# Patient Record
Sex: FEMALE | Race: BLACK OR AFRICAN AMERICAN | NOT HISPANIC OR LATINO | Employment: OTHER | URBAN - METROPOLITAN AREA
[De-identification: names, ages, dates, MRNs, and addresses within clinical notes are randomized per-mention and may not be internally consistent; named-entity substitution may affect disease eponyms.]

---

## 2017-06-02 LAB — CRC RECOMMENDATION EXT: NORMAL

## 2020-01-31 PROBLEM — E66.01 MORBID OBESITY, UNSPECIFIED OBESITY TYPE: Status: ACTIVE | Noted: 2017-05-10

## 2020-01-31 PROBLEM — F14.11 H/O COCAINE ABUSE: Status: ACTIVE | Noted: 2017-05-10

## 2020-01-31 PROBLEM — D17.1 LIPOMA OF BACK: Status: ACTIVE | Noted: 2017-08-11

## 2020-01-31 PROBLEM — F31.9 BIPOLAR 1 DISORDER: Status: ACTIVE | Noted: 2017-05-02

## 2020-01-31 PROBLEM — F14.10 COCAINE ABUSE: Status: ACTIVE | Noted: 2020-01-31

## 2020-01-31 PROBLEM — I10 HTN (HYPERTENSION), BENIGN: Status: ACTIVE | Noted: 2017-05-02

## 2020-01-31 PROBLEM — I42.9 CARDIOMYOPATHY: Status: ACTIVE | Noted: 2020-01-31

## 2020-01-31 PROBLEM — F33.3 MDD (MAJOR DEPRESSIVE DISORDER), RECURRENT, SEVERE, WITH PSYCHOSIS: Status: ACTIVE | Noted: 2017-05-10

## 2020-02-02 PROBLEM — Z86.19 HISTORY OF SYPHILIS: Status: ACTIVE | Noted: 2020-02-02

## 2020-05-21 PROBLEM — E66.9 OBESITY: Status: ACTIVE | Noted: 2017-05-10

## 2020-05-23 PROBLEM — E83.39 HYPOPHOSPHATEMIA: Status: ACTIVE | Noted: 2020-05-23

## 2020-06-03 ENCOUNTER — NURSE TRIAGE (OUTPATIENT)
Dept: ADMINISTRATIVE | Facility: CLINIC | Age: 55
End: 2020-06-03

## 2020-06-03 NOTE — TELEPHONE ENCOUNTER
Attempted to contact the pt x1 at [phone] regarding day 13 of [facility] Post Procedural Symptom Tracker.  Unable to reach patient, unable to leave message.    Reason for Disposition   No answer.  First attempt to contact caller.  Follow-up call scheduled within 15 minutes.    Protocols used: NO CONTACT OR DUPLICATE CONTACT CALL-A-AH

## 2020-06-04 ENCOUNTER — NURSE TRIAGE (OUTPATIENT)
Dept: ADMINISTRATIVE | Facility: CLINIC | Age: 55
End: 2020-06-04

## 2020-06-04 NOTE — TELEPHONE ENCOUNTER
Called for post procedural symptom tracker. No answer after 2nd attempt.    Reason for Disposition   Second attempt to contact family AND no contact made. Phone number verified.    Additional Information   Negative: Caller has already spoken with the PCP (or office), and has no further questions   Negative: Caller has already spoken with another triager and has no further questions   Negative: Caller has already spoken with another triager or PCP (or office), and has further questions and triager able to answer questions.   Negative: Busy signal.  First attempt to contact caller.  Follow-up call scheduled within 15 minutes.   Negative: No answer.  First attempt to contact caller.  Follow-up call scheduled within 15 minutes.   Negative: Message left on unidentified voice mail. Phone number verified.   Negative: Message left on identified voicemail   Negative: Message left with person in household   Negative: Wrong number reached. Phone number verified.    Protocols used: NO CONTACT OR DUPLICATE CONTACT CALL-A-OH

## 2020-06-04 NOTE — TELEPHONE ENCOUNTER
Reason for Disposition   Caller has cancelled the call before the first contact    Additional Information   Negative: Caller has already spoken with the PCP (or office), and has no further questions   Negative: Caller has already spoken with another triager and has no further questions   Negative: Caller has already spoken with another triager or PCP (or office), and has further questions and triager able to answer questions.   Negative: Busy signal.  First attempt to contact caller.  Follow-up call scheduled within 15 minutes.   Negative: No answer.  First attempt to contact caller.  Follow-up call scheduled within 15 minutes.   Negative: Message left on identified voicemail   Negative: Message left on unidentified voice mail. Phone number verified.   Negative: Message left with person in household   Negative: Wrong number reached. Phone number verified.   Negative: Second attempt to contact family AND no contact made. Phone number verified.   Negative: Cell phone out of range. Phone number verified.   Negative: Patient already left for the hospital/clinic    Protocols used: NO CONTACT OR DUPLICATE CONTACT CALL-A-OH

## 2020-06-10 PROBLEM — Z98.890 STATUS POST COLOSTOMY TAKEDOWN: Status: ACTIVE | Noted: 2020-06-10

## 2020-06-10 PROBLEM — Z90.49 STATUS POST SMALL BOWEL RESECTION: Status: ACTIVE | Noted: 2020-06-10

## 2020-06-10 PROBLEM — Z90.49 STATUS POST APPENDECTOMY: Status: ACTIVE | Noted: 2020-06-10

## 2020-06-17 PROBLEM — Z48.89 ENCOUNTER FOR POSTOPERATIVE WOUND CHECK: Status: ACTIVE | Noted: 2020-06-17

## 2020-07-13 PROBLEM — E87.6 HYPOKALEMIA: Status: ACTIVE | Noted: 2020-07-13

## 2020-07-13 PROBLEM — F17.210 CIGARETTE SMOKER: Status: ACTIVE | Noted: 2020-07-13

## 2020-07-13 PROBLEM — R10.9 RIGHT FLANK PAIN: Status: ACTIVE | Noted: 2020-07-13

## 2020-07-13 PROBLEM — I26.99 ACUTE PULMONARY EMBOLISM WITHOUT ACUTE COR PULMONALE: Status: ACTIVE | Noted: 2020-07-13

## 2020-11-03 PROBLEM — J06.9 VIRAL UPPER RESPIRATORY TRACT INFECTION: Status: ACTIVE | Noted: 2020-11-03

## 2022-04-13 PROBLEM — G62.9 NEUROPATHY: Status: ACTIVE | Noted: 2022-04-13

## 2022-05-27 ENCOUNTER — PATIENT OUTREACH (OUTPATIENT)
Dept: ADMINISTRATIVE | Facility: HOSPITAL | Age: 57
End: 2022-05-27
Payer: MEDICAID

## 2022-05-27 ENCOUNTER — PATIENT MESSAGE (OUTPATIENT)
Dept: ADMINISTRATIVE | Facility: HOSPITAL | Age: 57
End: 2022-05-27
Payer: MEDICAID

## 2022-07-14 ENCOUNTER — OFFICE VISIT (OUTPATIENT)
Dept: PRIMARY CARE CLINIC | Facility: CLINIC | Age: 57
End: 2022-07-14
Payer: MEDICAID

## 2022-07-14 VITALS
OXYGEN SATURATION: 99 % | HEIGHT: 64 IN | HEART RATE: 67 BPM | SYSTOLIC BLOOD PRESSURE: 142 MMHG | WEIGHT: 155.81 LBS | BODY MASS INDEX: 26.6 KG/M2 | DIASTOLIC BLOOD PRESSURE: 90 MMHG

## 2022-07-14 DIAGNOSIS — Z48.89 ENCOUNTER FOR POSTOPERATIVE WOUND CHECK: ICD-10-CM

## 2022-07-14 DIAGNOSIS — G62.9 NEUROPATHY: ICD-10-CM

## 2022-07-14 DIAGNOSIS — I50.9 CHRONIC CONGESTIVE HEART FAILURE, UNSPECIFIED HEART FAILURE TYPE: ICD-10-CM

## 2022-07-14 DIAGNOSIS — M79.604 PAIN OF RIGHT LOWER EXTREMITY: ICD-10-CM

## 2022-07-14 DIAGNOSIS — J30.89 ENVIRONMENTAL AND SEASONAL ALLERGIES: ICD-10-CM

## 2022-07-14 DIAGNOSIS — Z12.39 ENCOUNTER FOR SCREENING FOR MALIGNANT NEOPLASM OF BREAST, UNSPECIFIED SCREENING MODALITY: ICD-10-CM

## 2022-07-14 DIAGNOSIS — F31.9 BIPOLAR 1 DISORDER: ICD-10-CM

## 2022-07-14 DIAGNOSIS — I10 HTN (HYPERTENSION), BENIGN: ICD-10-CM

## 2022-07-14 DIAGNOSIS — R21 RASH AND NONSPECIFIC SKIN ERUPTION: Primary | ICD-10-CM

## 2022-07-14 PROCEDURE — 1159F MED LIST DOCD IN RCRD: CPT | Mod: CPTII,S$GLB,, | Performed by: INTERNAL MEDICINE

## 2022-07-14 PROCEDURE — 3077F PR MOST RECENT SYSTOLIC BLOOD PRESSURE >= 140 MM HG: ICD-10-PCS | Mod: CPTII,S$GLB,, | Performed by: INTERNAL MEDICINE

## 2022-07-14 PROCEDURE — 1159F PR MEDICATION LIST DOCUMENTED IN MEDICAL RECORD: ICD-10-PCS | Mod: CPTII,S$GLB,, | Performed by: INTERNAL MEDICINE

## 2022-07-14 PROCEDURE — 3077F SYST BP >= 140 MM HG: CPT | Mod: CPTII,S$GLB,, | Performed by: INTERNAL MEDICINE

## 2022-07-14 PROCEDURE — 3008F PR BODY MASS INDEX (BMI) DOCUMENTED: ICD-10-PCS | Mod: CPTII,S$GLB,, | Performed by: INTERNAL MEDICINE

## 2022-07-14 PROCEDURE — 3080F DIAST BP >= 90 MM HG: CPT | Mod: CPTII,S$GLB,, | Performed by: INTERNAL MEDICINE

## 2022-07-14 PROCEDURE — 3008F BODY MASS INDEX DOCD: CPT | Mod: CPTII,S$GLB,, | Performed by: INTERNAL MEDICINE

## 2022-07-14 PROCEDURE — 99204 PR OFFICE/OUTPT VISIT, NEW, LEVL IV, 45-59 MIN: ICD-10-PCS | Mod: S$GLB,,, | Performed by: INTERNAL MEDICINE

## 2022-07-14 PROCEDURE — 99204 OFFICE O/P NEW MOD 45 MIN: CPT | Mod: S$GLB,,, | Performed by: INTERNAL MEDICINE

## 2022-07-14 PROCEDURE — 3080F PR MOST RECENT DIASTOLIC BLOOD PRESSURE >= 90 MM HG: ICD-10-PCS | Mod: CPTII,S$GLB,, | Performed by: INTERNAL MEDICINE

## 2022-07-14 RX ORDER — LITHIUM CARBONATE 600 MG/1
600 CAPSULE ORAL 2 TIMES DAILY
Status: CANCELLED | OUTPATIENT
Start: 2022-07-14

## 2022-07-14 RX ORDER — FUROSEMIDE 20 MG/1
20 TABLET ORAL DAILY
Qty: 30 TABLET | Refills: 11 | Status: CANCELLED | OUTPATIENT
Start: 2022-07-14

## 2022-07-14 RX ORDER — LITHIUM CARBONATE 600 MG/1
600 CAPSULE ORAL 2 TIMES DAILY
COMMUNITY
Start: 2022-06-06

## 2022-07-14 RX ORDER — FLUTICASONE PROPIONATE 50 MCG
1 SPRAY, SUSPENSION (ML) NASAL DAILY PRN
Qty: 9.9 ML | Refills: 0 | Status: SHIPPED | OUTPATIENT
Start: 2022-07-14 | End: 2024-03-11 | Stop reason: SDUPTHER

## 2022-07-14 RX ORDER — HYDROXYZINE PAMOATE 25 MG/1
25 CAPSULE ORAL 3 TIMES DAILY
COMMUNITY
End: 2022-07-14 | Stop reason: SDUPTHER

## 2022-07-14 RX ORDER — HYDROXYZINE PAMOATE 25 MG/1
25 CAPSULE ORAL EVERY 8 HOURS PRN
Qty: 90 CAPSULE | Refills: 3 | Status: SHIPPED | OUTPATIENT
Start: 2022-07-14 | End: 2023-12-06 | Stop reason: SDUPTHER

## 2022-07-14 RX ORDER — FUROSEMIDE 20 MG/1
20 TABLET ORAL DAILY PRN
Qty: 30 TABLET | Refills: 0 | Status: SHIPPED | OUTPATIENT
Start: 2022-07-14

## 2022-07-14 RX ORDER — HYDROCORTISONE 25 MG/G
CREAM TOPICAL 2 TIMES DAILY
Qty: 28 G | Refills: 3 | Status: SHIPPED | OUTPATIENT
Start: 2022-07-14

## 2022-07-14 RX ORDER — HYDROXYZINE PAMOATE 25 MG/1
25 CAPSULE ORAL 3 TIMES DAILY
COMMUNITY
Start: 2022-06-06 | End: 2022-07-14 | Stop reason: ALTCHOICE

## 2022-07-14 RX ORDER — IBUPROFEN 800 MG/1
800 TABLET ORAL DAILY
Qty: 90 TABLET | Refills: 1 | Status: ON HOLD | OUTPATIENT
Start: 2022-07-14 | End: 2022-10-17 | Stop reason: HOSPADM

## 2022-07-14 RX ORDER — GABAPENTIN 300 MG/1
300 CAPSULE ORAL 3 TIMES DAILY
Qty: 90 CAPSULE | Refills: 0 | Status: SHIPPED | OUTPATIENT
Start: 2022-07-14 | End: 2022-10-13 | Stop reason: DRUGHIGH

## 2022-07-14 RX ORDER — AMLODIPINE BESYLATE 10 MG/1
10 TABLET ORAL DAILY
Qty: 30 TABLET | Refills: 11 | Status: SHIPPED | OUTPATIENT
Start: 2022-07-14 | End: 2023-12-06 | Stop reason: SDUPTHER

## 2022-07-14 RX ORDER — TRAZODONE HYDROCHLORIDE 150 MG/1
150 TABLET ORAL NIGHTLY
Qty: 90 TABLET | Refills: 3 | Status: SHIPPED | OUTPATIENT
Start: 2022-07-14 | End: 2023-12-06 | Stop reason: SDUPTHER

## 2022-07-14 NOTE — PROGRESS NOTES
Subjective:      Patient ID: Alissa Lincoln is a 57 y.o. female.    Chief Complaint: Establish Care and Hair/Scalp Problem (She dyed her hair about 3 days ago and states it burned her scalp. She also states there are knots on her head, behind her ears, all over. She states it is painful. She does have an area in her neck that is irritated. She would like to get a cream. )    HPI     Patient here to establish care. [facility] and works at [employer]    She reports being sober for 3 months     Past Medical History:   Diagnosis Date    (HFpEF) heart failure with preserved ejection fraction     Anemia     Bipolar 1 disorder     Colostomy present     HTN (hypertension)     Hx SBO      Past Surgical History:   Procedure Laterality Date    APPENDECTOMY N/A 5/21/2020    Procedure: APPENDECTOMY;  Surgeon: Kush Hutson MD;  Location: [facility] MAIN OR;  Service: General;  Laterality: N/A;    COLONOSCOPY  04/12/2017    through stoma. Does not appear to have had rectal stump evaluated    COLONOSCOPY Left 06/02/2017    flexible sigmoidoscopy of rectal stump; Surgeon: Yadiel Sommers Jr., MD    FLEXIBLE SIGMOIDOSCOPY  10/20/2014    pouch about 23 cm    ADALI PROCEDURE  08/10/2012    DEBI, enterotomy repair, Chowdhury's pouch     LYSIS OF ADHESIONS  09/06/2012    pt developed bowel obstruction after sigmoid colon resection with Chowdhury's pouch. Pt explored, DEBI performed.     LYSIS OF ADHESIONS N/A 5/21/2020    Procedure: LYSIS, ADHESIONS;  Surgeon: Kush Hutson MD;  Location: [facility] MAIN OR;  Service: General;  Laterality: N/A;    SIGMOIDECTOMY  08/10/2012    SMALL INTESTINE SURGERY  09/10/2012    TOTAL ABDOMINAL HYSTERECTOMY W/ BILATERAL SALPINGOOPHORECTOMY  08/10/2012     Family History   Problem Relation Age of Onset    Heart disease Mother      Social History     Socioeconomic History    Marital status: Single   Tobacco Use    Smoking status: Former Smoker     Types: Cigarettes    Smokeless  "tobacco: Never Used   Substance and Sexual Activity    Alcohol use: Not Currently    Drug use: Not Currently     Types: Marijuana, Cocaine    Sexual activity: Yes     Partners: Male     Birth control/protection: Condom       Review of Systems   Constitutional: Negative for chills and fever.   Respiratory: Negative for cough, shortness of breath and wheezing.    Cardiovascular: Negative for chest pain, palpitations and leg swelling.   Genitourinary: Negative for dysuria, frequency and urgency.   Musculoskeletal: Negative for falls.   Skin: Positive for itching and rash.        She states she put some dye on her hair and it burns   Neurological: Negative for dizziness and headaches.   Psychiatric/Behavioral:        Bipolar     Objective:     Physical Exam  Vitals reviewed.   Constitutional:       Appearance: Normal appearance.   HENT:      Head: Normocephalic.   Eyes:      Extraocular Movements: Extraocular movements intact.      Conjunctiva/sclera: Conjunctivae normal.      Pupils: Pupils are equal, round, and reactive to light.   Cardiovascular:      Rate and Rhythm: Normal rate and regular rhythm.   Pulmonary:      Effort: Pulmonary effort is normal.      Breath sounds: Normal breath sounds.   Abdominal:      General: Bowel sounds are normal.   Musculoskeletal:         General: Normal range of motion.      Right lower leg: No edema.      Left lower leg: No edema.   Skin:     General: Skin is dry.      Capillary Refill: Capillary refill takes less than 2 seconds.      Findings: Rash present.   Neurological:      General: No focal deficit present.      Mental Status: She is alert and oriented to person, place, and time.   Psychiatric:         Mood and Affect: Mood normal.        BP (!) 142/90 (BP Location: Left arm, Patient Position: Sitting, BP Method: Medium (Manual))   Pulse 67   Ht 5' 4" (1.626 m)   Wt 70.7 kg (155 lb 12.8 oz)   SpO2 99%   BMI 26.74 kg/m²     Assessment:       ICD-10-CM ICD-9-CM   1. Rash " and nonspecific skin eruption  R21 782.1   2. HTN (hypertension), benign  I10 401.1   3. Chronic congestive heart failure, unspecified heart failure type  I50.9 428.0   4. Encounter for postoperative wound check  Z48.89 V58.49   5. Neuropathy  G62.9 355.9   6. Pain of right lower extremity  M79.604 729.5   7. Bipolar 1 disorder  F31.9 296.7   8. Environmental and seasonal allergies  J30.89 477.8   9. Encounter for screening for malignant neoplasm of breast, unspecified screening modality  Z12.39 V76.10       Plan:     Medication List with Changes/Refills   New Medications    HYDROCORTISONE 2.5 % CREAM    Apply topically 2 (two) times daily.   Current Medications    HYDROCORTISONE 1 % CREAM    Apply to rash 2 times daily until resolved.    LITHIUM 600 MG CAPSULE    Take 600 mg by mouth 2 (two) times daily.    POLYETHYLENE GLYCOL (GLYCOLAX) 17 GRAM PWPK    Please mix 1 pack/ capful (17 g) in 8 oz of juice or water. Give 1 pack/capful daily, adjust according to stool consistency to keep soft.   Changed and/or Refilled Medications    Modified Medication Previous Medication    AMLODIPINE (NORVASC) 10 MG TABLET amLODIPine (NORVASC) 10 MG tablet       Take 1 tablet (10 mg total) by mouth once daily.    Take 1 tablet (10 mg total) by mouth once daily.    FLUTICASONE PROPIONATE (FLONASE) 50 MCG/ACTUATION NASAL SPRAY fluticasone propionate (FLONASE) 50 mcg/actuation nasal spray       1 spray (50 mcg total) by Each Nostril route daily as needed for Rhinitis or Allergies.    1 spray (50 mcg total) by Each Nostril route daily as needed for Rhinitis or Allergies.    FUROSEMIDE (LASIX) 20 MG TABLET furosemide (LASIX) 20 MG tablet       Take 1 tablet (20 mg total) by mouth daily as needed.    Take 1 tablet (20 mg total) by mouth once daily.    GABAPENTIN (NEURONTIN) 300 MG CAPSULE gabapentin (NEURONTIN) 300 MG capsule       Take 1 capsule (300 mg total) by mouth 3 (three) times daily. Take 1 capsule by mouth three times a day.     Take 1 capsule (300 mg total) by mouth 3 (three) times daily. Take 1 capsule by mouth three times a day.    HYDROXYZINE PAMOATE (VISTARIL) 25 MG CAP hydrOXYzine pamoate (VISTARIL) 25 MG Cap       Take 1 capsule (25 mg total) by mouth every 8 (eight) hours as needed (anxiety).    Take 25 mg by mouth 3 (three) times daily.    IBUPROFEN (ADVIL,MOTRIN) 800 MG TABLET ibuprofen (ADVIL,MOTRIN) 800 MG tablet       Take 1 tablet (800 mg total) by mouth once daily.    Take 1 tablet (800 mg total) by mouth once daily.    TRAZODONE (DESYREL) 150 MG TABLET traZODone (DESYREL) 150 MG tablet       Take 1 tablet (150 mg total) by mouth every evening.    Take 150 mg by mouth every evening.   Discontinued Medications    ACETAMINOPHEN (TYLENOL) 500 MG TABLET    Take 1 tablet (500 mg total) by mouth every 6 (six) hours as needed for Pain.    FLUTICASONE (VERAMYST) 27.5 MCG/ACTUATION NASAL SPRAY    2 sprays by Nasal route once daily.    HYDROXYZINE HCL (ATARAX) 25 MG TABLET    Take 1 tablet (25 mg total) by mouth 3 (three) times daily as needed for Anxiety.    HYDROXYZINE PAMOATE (VISTARIL) 25 MG CAP    Take 25 mg by mouth 3 (three) times daily.    RISPERIDONE (RISPERDAL) 2 MG TABLET    Take 2 mg by mouth 2 (two) times daily.        Rash and nonspecific skin eruption  -     hydrocortisone 2.5 % cream; Apply topically 2 (two) times daily.  Dispense: 28 g; Refill: 3    HTN (hypertension), benign  -     amLODIPine (NORVASC) 10 MG tablet; Take 1 tablet (10 mg total) by mouth once daily.  Dispense: 30 tablet; Refill: 11    Chronic congestive heart failure, unspecified heart failure type  -     furosemide (LASIX) 20 MG tablet; Take 1 tablet (20 mg total) by mouth daily as needed.  Dispense: 30 tablet; Refill: 0    Encounter for postoperative wound check  -     gabapentin (NEURONTIN) 300 MG capsule; Take 1 capsule (300 mg total) by mouth 3 (three) times daily. Take 1 capsule by mouth three times a day.  Dispense: 90 capsule; Refill:  0    Neuropathy  -     gabapentin (NEURONTIN) 300 MG capsule; Take 1 capsule (300 mg total) by mouth 3 (three) times daily. Take 1 capsule by mouth three times a day.  Dispense: 90 capsule; Refill: 0    Pain of right lower extremity  -     ibuprofen (ADVIL,MOTRIN) 800 MG tablet; Take 1 tablet (800 mg total) by mouth once daily.  Dispense: 90 tablet; Refill: 1    Bipolar 1 disorder  -     hydrOXYzine pamoate (VISTARIL) 25 MG Cap; Take 1 capsule (25 mg total) by mouth every 8 (eight) hours as needed (anxiety).  Dispense: 90 capsule; Refill: 3  -     traZODone (DESYREL) 150 MG tablet; Take 1 tablet (150 mg total) by mouth every evening.  Dispense: 90 tablet; Refill: 3    Environmental and seasonal allergies  -     fluticasone propionate (FLONASE) 50 mcg/actuation nasal spray; 1 spray (50 mcg total) by Each Nostril route daily as needed for Rhinitis or Allergies.  Dispense: 9.9 mL; Refill: 0    Encounter for screening for malignant neoplasm of breast, unspecified screening modality  -     Mammo Digital Screening Bilat; Future; Expected date: 07/14/2022         Future Appointments   Date Time Provider Department Center   10/13/2022 10:00 AM Shena Post MD [facility]       She was going to [place] and states she was prescribed lithium there. She states she is unsure why she is on it and states she doesn't like the way it makes her feel. I gave her the number for the [facility]. She states she has access to transportation.

## 2022-08-03 ENCOUNTER — TELEPHONE (OUTPATIENT)
Dept: ADMINISTRATIVE | Facility: HOSPITAL | Age: 57
End: 2022-08-03
Payer: MEDICAID

## 2022-10-12 PROBLEM — R19.7 DIARRHEA OF PRESUMED INFECTIOUS ORIGIN: Status: ACTIVE | Noted: 2022-10-12

## 2022-10-13 PROBLEM — A04.72 C. DIFFICILE COLITIS: Status: ACTIVE | Noted: 2022-10-13

## 2022-10-17 PROBLEM — E87.6 HYPOKALEMIA: Status: RESOLVED | Noted: 2020-07-13 | Resolved: 2022-10-17

## 2022-11-09 ENCOUNTER — PATIENT MESSAGE (OUTPATIENT)
Dept: FAMILY MEDICINE | Facility: CLINIC | Age: 57
End: 2022-11-09
Payer: MEDICAID

## 2024-05-01 PROBLEM — R15.9 FULL INCONTINENCE OF FECES: Status: ACTIVE | Noted: 2024-05-01

## 2024-05-01 PROBLEM — R15.9 FULL INCONTINENCE OF FECES: Status: RESOLVED | Noted: 2024-05-01 | Resolved: 2024-05-01
